# Patient Record
Sex: MALE | Race: BLACK OR AFRICAN AMERICAN | Employment: FULL TIME | URBAN - METROPOLITAN AREA
[De-identification: names, ages, dates, MRNs, and addresses within clinical notes are randomized per-mention and may not be internally consistent; named-entity substitution may affect disease eponyms.]

---

## 2023-09-24 ENCOUNTER — HOSPITAL ENCOUNTER (EMERGENCY)
Age: 68
Discharge: HOME | End: 2023-09-24
Attending: EMERGENCY MEDICINE
Payer: MEDICARE

## 2023-09-24 ENCOUNTER — APPOINTMENT (OUTPATIENT)
Dept: CT IMAGING | Age: 68
End: 2023-09-24
Payer: MEDICARE

## 2023-09-24 VITALS
WEIGHT: 210 LBS | HEIGHT: 74 IN | TEMPERATURE: 99 F | RESPIRATION RATE: 18 BRPM | BODY MASS INDEX: 26.95 KG/M2 | SYSTOLIC BLOOD PRESSURE: 143 MMHG | DIASTOLIC BLOOD PRESSURE: 86 MMHG | HEART RATE: 100 BPM | OXYGEN SATURATION: 100 %

## 2023-09-24 DIAGNOSIS — N32.0 BLADDER OUTLET OBSTRUCTION: Primary | ICD-10-CM

## 2023-09-24 DIAGNOSIS — R33.9 ACUTE ON CHRONIC URINARY RETENTION: ICD-10-CM

## 2023-09-24 LAB
ALBUMIN SERPL-MCNC: 4.2 G/DL (ref 3.4–5)
ALBUMIN/GLOB SERPL: 1.3 {RATIO} (ref 1.1–2.2)
ALP SERPL-CCNC: 82 U/L (ref 40–129)
ALT SERPL-CCNC: 20 U/L (ref 10–40)
ANION GAP SERPL CALCULATED.3IONS-SCNC: 12 MMOL/L (ref 3–16)
AST SERPL-CCNC: 24 U/L (ref 15–37)
BASOPHILS # BLD: 0.1 K/UL (ref 0–0.2)
BASOPHILS NFR BLD: 1.4 %
BILIRUB SERPL-MCNC: 0.4 MG/DL (ref 0–1)
BILIRUB UR QL STRIP.AUTO: NEGATIVE
BUN SERPL-MCNC: 13 MG/DL (ref 7–20)
CALCIUM SERPL-MCNC: 9.4 MG/DL (ref 8.3–10.6)
CHLORIDE SERPL-SCNC: 103 MMOL/L (ref 99–110)
CLARITY UR: CLEAR
CO2 SERPL-SCNC: 25 MMOL/L (ref 21–32)
COLOR UR: YELLOW
CREAT SERPL-MCNC: 0.9 MG/DL (ref 0.8–1.3)
DEPRECATED RDW RBC AUTO: 14.7 % (ref 12.4–15.4)
EOSINOPHIL # BLD: 0.3 K/UL (ref 0–0.6)
EOSINOPHIL NFR BLD: 3.6 %
GFR SERPLBLD CREATININE-BSD FMLA CKD-EPI: >60 ML/MIN/{1.73_M2}
GLUCOSE SERPL-MCNC: 119 MG/DL (ref 70–99)
GLUCOSE UR STRIP.AUTO-MCNC: NEGATIVE MG/DL
HCT VFR BLD AUTO: 42.7 % (ref 40.5–52.5)
HGB BLD-MCNC: 14.7 G/DL (ref 13.5–17.5)
HGB UR QL STRIP.AUTO: NEGATIVE
KETONES UR STRIP.AUTO-MCNC: NEGATIVE MG/DL
LEUKOCYTE ESTERASE UR QL STRIP.AUTO: NEGATIVE
LYMPHOCYTES # BLD: 3 K/UL (ref 1–5.1)
LYMPHOCYTES NFR BLD: 32.7 %
MCH RBC QN AUTO: 32.5 PG (ref 26–34)
MCHC RBC AUTO-ENTMCNC: 34.4 G/DL (ref 31–36)
MCV RBC AUTO: 94.4 FL (ref 80–100)
MONOCYTES # BLD: 1.1 K/UL (ref 0–1.3)
MONOCYTES NFR BLD: 11.3 %
NEUTROPHILS # BLD: 4.7 K/UL (ref 1.7–7.7)
NEUTROPHILS NFR BLD: 51 %
NITRITE UR QL STRIP.AUTO: NEGATIVE
PH UR STRIP.AUTO: 5.5 [PH] (ref 5–8)
PLATELET # BLD AUTO: 298 K/UL (ref 135–450)
PMV BLD AUTO: 8 FL (ref 5–10.5)
POTASSIUM SERPL-SCNC: 4.1 MMOL/L (ref 3.5–5.1)
PROT SERPL-MCNC: 7.5 G/DL (ref 6.4–8.2)
PROT UR STRIP.AUTO-MCNC: NEGATIVE MG/DL
RBC # BLD AUTO: 4.53 M/UL (ref 4.2–5.9)
SODIUM SERPL-SCNC: 140 MMOL/L (ref 136–145)
SP GR UR STRIP.AUTO: 1.02 (ref 1–1.03)
UA COMPLETE W REFLEX CULTURE PNL UR: NORMAL
UA DIPSTICK W REFLEX MICRO PNL UR: NORMAL
URN SPEC COLLECT METH UR: NORMAL
UROBILINOGEN UR STRIP-ACNC: 0.2 E.U./DL
WBC # BLD AUTO: 9.3 K/UL (ref 4–11)

## 2023-09-24 PROCEDURE — 99284 EMERGENCY DEPT VISIT MOD MDM: CPT

## 2023-09-24 PROCEDURE — 85025 COMPLETE CBC W/AUTO DIFF WBC: CPT

## 2023-09-24 PROCEDURE — 51798 US URINE CAPACITY MEASURE: CPT

## 2023-09-24 PROCEDURE — 51702 INSERT TEMP BLADDER CATH: CPT

## 2023-09-24 PROCEDURE — 74176 CT ABD & PELVIS W/O CONTRAST: CPT

## 2023-09-24 PROCEDURE — 80053 COMPREHEN METABOLIC PANEL: CPT

## 2023-09-24 PROCEDURE — 81003 URINALYSIS AUTO W/O SCOPE: CPT

## 2023-09-24 ASSESSMENT — PAIN SCALES - GENERAL: PAINLEVEL_OUTOF10: 10

## 2023-09-24 ASSESSMENT — PAIN - FUNCTIONAL ASSESSMENT: PAIN_FUNCTIONAL_ASSESSMENT: 0-10

## 2023-09-24 NOTE — ED NOTES
This RN to bedside to bladder scan patient. Patient standing in room with urinal and states \"I need to go to the bathroom to wipe myself. I pushed too hard and pooped myself\". Patient independently ambulatory to bathroom with steady gait at this time.       Wendy Jaramillo RN  09/24/23 5674

## 2023-09-24 NOTE — ED NOTES
Patient bladder scanned, 610 ml of urine showing in bladder. Dr. Sb Hastings made aware.       Jailene Warren, RN  09/24/23 0170

## 2023-09-24 NOTE — ED NOTES
Leg bag in place  Discharge instructions and follow up care reviewed  PIV removed without difficulty  Pt ambulatory to exit     [facility], RN  09/24/23 6301

## 2023-09-24 NOTE — ED PROVIDER NOTES
[facility] ([facility]) Emergency [address]    Alejandra Dance, MD, am the primary clinician of record. CHIEF COMPLAINT  Chief Complaint   Patient presents with    Urinary Retention     Patient states trouble urinating that started this am. States has had this problem before, hx of enlarged prostate. HISTORY OF PRESENT ILLNESS  Braxton Kinsey is a 79 y.o. male  who presents to the ED complaining of difficulty with urination starting this morning around 6 in the morning approximately. He has had lower abdominal distention and discomfort since then most of the midline. He has a history of enlarged prostate and is on Flomax already and has a history of this happening to him roughly 6 months ago according to the patient where he required a catheter. He denies any nausea vomiting diarrhea or constipation. No back pain or other abdominal pain. No testicular pain or scrotal edema. No fevers or chills. No other complaints, modifying factors or associated symptoms. I have reviewed the following from the nursing documentation. Past Medical History:   Diagnosis Date    Hypertension      History reviewed. No pertinent surgical history. History reviewed. No pertinent family history.   Social History     Socioeconomic History    Marital status:      Spouse name: Not on file    Number of children: Not on file    Years of education: Not on file    Highest education level: Not on file   Occupational History    Not on file   Tobacco Use    Smoking status: Every Day     Types: Cigarettes    Smokeless tobacco: Not on file   Substance and Sexual Activity    Alcohol use: Yes    Drug use: No    Sexual activity: Not on file   Other Topics Concern    Not on file   Social History Narrative    Not on file     Social Determinants of Health     Financial Resource Strain: Not on file   Food Insecurity: Not on file   Transportation Needs: Not on file   Physical Activity: Not on file   Stress: Not on file

## 2023-11-07 ENCOUNTER — HOSPITAL ENCOUNTER (EMERGENCY)
Age: 68
Discharge: HOME | End: 2023-11-07
Payer: MEDICARE

## 2023-11-07 VITALS
DIASTOLIC BLOOD PRESSURE: 75 MMHG | SYSTOLIC BLOOD PRESSURE: 136 MMHG | WEIGHT: 197 LBS | HEART RATE: 92 BPM | TEMPERATURE: 98.5 F | OXYGEN SATURATION: 95 % | BODY MASS INDEX: 26.68 KG/M2 | RESPIRATION RATE: 16 BRPM | HEIGHT: 72 IN

## 2023-11-07 DIAGNOSIS — N30.00 ACUTE CYSTITIS WITHOUT HEMATURIA: Primary | ICD-10-CM

## 2023-11-07 DIAGNOSIS — N40.1 BENIGN PROSTATIC HYPERPLASIA WITH URINARY FREQUENCY: ICD-10-CM

## 2023-11-07 DIAGNOSIS — R35.0 BENIGN PROSTATIC HYPERPLASIA WITH URINARY FREQUENCY: ICD-10-CM

## 2023-11-07 LAB
ALBUMIN SERPL-MCNC: 3.9 G/DL (ref 3.4–5)
ALBUMIN/GLOB SERPL: 1.1 {RATIO} (ref 1.1–2.2)
ALP SERPL-CCNC: 70 U/L (ref 40–129)
ALT SERPL-CCNC: 18 U/L (ref 10–40)
ANION GAP SERPL CALCULATED.3IONS-SCNC: 11 MMOL/L (ref 3–16)
AST SERPL-CCNC: 18 U/L (ref 15–37)
BACTERIA URNS QL MICRO: ABNORMAL /HPF
BASOPHILS # BLD: 0.2 K/UL (ref 0–0.2)
BASOPHILS NFR BLD: 1.4 %
BILIRUB SERPL-MCNC: 0.4 MG/DL (ref 0–1)
BILIRUB UR QL STRIP.AUTO: NEGATIVE
BUN SERPL-MCNC: 15 MG/DL (ref 7–20)
CALCIUM SERPL-MCNC: 9.3 MG/DL (ref 8.3–10.6)
CHLORIDE SERPL-SCNC: 99 MMOL/L (ref 99–110)
CLARITY UR: CLEAR
CO2 SERPL-SCNC: 25 MMOL/L (ref 21–32)
COLOR UR: YELLOW
CREAT SERPL-MCNC: 1 MG/DL (ref 0.8–1.3)
DEPRECATED RDW RBC AUTO: 14.2 % (ref 12.4–15.4)
EOSINOPHIL # BLD: 0.3 K/UL (ref 0–0.6)
EOSINOPHIL NFR BLD: 2.5 %
EPI CELLS #/AREA URNS AUTO: 1 /HPF (ref 0–5)
GFR SERPLBLD CREATININE-BSD FMLA CKD-EPI: >60 ML/MIN/{1.73_M2}
GLUCOSE SERPL-MCNC: 102 MG/DL (ref 70–99)
GLUCOSE UR STRIP.AUTO-MCNC: NEGATIVE MG/DL
HCT VFR BLD AUTO: 36.8 % (ref 40.5–52.5)
HGB BLD-MCNC: 12.5 G/DL (ref 13.5–17.5)
HGB UR QL STRIP.AUTO: NEGATIVE
HYALINE CASTS #/AREA URNS AUTO: 0 /LPF (ref 0–8)
KETONES UR STRIP.AUTO-MCNC: NEGATIVE MG/DL
LEUKOCYTE ESTERASE UR QL STRIP.AUTO: ABNORMAL
LYMPHOCYTES # BLD: 3 K/UL (ref 1–5.1)
LYMPHOCYTES NFR BLD: 25.9 %
MCH RBC QN AUTO: 31.5 PG (ref 26–34)
MCHC RBC AUTO-ENTMCNC: 34 G/DL (ref 31–36)
MCV RBC AUTO: 92.7 FL (ref 80–100)
MONOCYTES # BLD: 1.1 K/UL (ref 0–1.3)
MONOCYTES NFR BLD: 9.8 %
NEUTROPHILS # BLD: 7 K/UL (ref 1.7–7.7)
NEUTROPHILS NFR BLD: 60.4 %
NITRITE UR QL STRIP.AUTO: NEGATIVE
PH UR STRIP.AUTO: 6 [PH] (ref 5–8)
PLATELET # BLD AUTO: 382 K/UL (ref 135–450)
PMV BLD AUTO: 7.2 FL (ref 5–10.5)
POTASSIUM SERPL-SCNC: 3.6 MMOL/L (ref 3.5–5.1)
PROT SERPL-MCNC: 7.5 G/DL (ref 6.4–8.2)
PROT UR STRIP.AUTO-MCNC: ABNORMAL MG/DL
RBC # BLD AUTO: 3.97 M/UL (ref 4.2–5.9)
RBC CLUMPS #/AREA URNS AUTO: 1 /HPF (ref 0–4)
SODIUM SERPL-SCNC: 135 MMOL/L (ref 136–145)
SP GR UR STRIP.AUTO: 1.02 (ref 1–1.03)
UA COMPLETE W REFLEX CULTURE PNL UR: YES
UA DIPSTICK W REFLEX MICRO PNL UR: YES
URN SPEC COLLECT METH UR: ABNORMAL
UROBILINOGEN UR STRIP-ACNC: 0.2 E.U./DL
WBC # BLD AUTO: 11.7 K/UL (ref 4–11)
WBC #/AREA URNS AUTO: 32 /HPF (ref 0–5)

## 2023-11-07 PROCEDURE — 81001 URINALYSIS AUTO W/SCOPE: CPT

## 2023-11-07 PROCEDURE — 87086 URINE CULTURE/COLONY COUNT: CPT

## 2023-11-07 PROCEDURE — 99283 EMERGENCY DEPT VISIT LOW MDM: CPT

## 2023-11-07 PROCEDURE — 85025 COMPLETE CBC W/AUTO DIFF WBC: CPT

## 2023-11-07 PROCEDURE — 80053 COMPREHEN METABOLIC PANEL: CPT

## 2023-11-07 RX ORDER — PHENAZOPYRIDINE HYDROCHLORIDE 100 MG/1
100 TABLET, FILM COATED ORAL 3 TIMES DAILY PRN
Qty: 6 TABLET | Refills: 0 | Status: SHIPPED | OUTPATIENT
Start: 2023-11-07 | End: 2023-11-09

## 2023-11-07 RX ORDER — CEFUROXIME AXETIL 500 MG/1
500 TABLET ORAL 2 TIMES DAILY
Qty: 20 TABLET | Refills: 0 | Status: SHIPPED | OUTPATIENT
Start: 2023-11-07 | End: 2023-11-17

## 2023-11-07 ASSESSMENT — PAIN SCALES - GENERAL: PAINLEVEL_OUTOF10: 2

## 2023-11-07 NOTE — ED PROVIDER NOTES
Unable to reach patient.  Result letter sent to patient's home address.   [address]    Schedule an appointment as soon as possible for a visit in 1 week  For re-check    [facility] Emergency Department  [place]  [phone]    As needed      DISCHARGE MEDICATIONS:  Discharge Medication List as of 11/7/2023 10:42 AM        START taking these medications    Details   cefUROXime (CEFTIN) 500 MG tablet Take 1 tablet by mouth 2 times daily for 10 days, Disp-20 tablet, R-0Normal      phenazopyridine (PYRIDIUM) 100 MG tablet Take 1 tablet by mouth 3 times daily as needed for Pain, Disp-6 tablet, R-0Normal             DISCONTINUED MEDICATIONS:  Discharge Medication List as of 11/7/2023 10:42 AM                 (Please note that portions of this note were completed with a voice recognition program.  Efforts were made to edit the dictations but occasionally words are mis-transcribed.)    Jillian Mcgee PA-C (electronically signed)        Jillian Mcgee PA-C  11/07/23 6096

## 2023-11-08 LAB — BACTERIA UR CULT: NORMAL

## 2025-02-28 ENCOUNTER — APPOINTMENT (OUTPATIENT)
Age: 70
End: 2025-02-28
Attending: HOSPITALIST
Payer: MEDICARE

## 2025-02-28 ENCOUNTER — APPOINTMENT (OUTPATIENT)
Dept: GENERAL RADIOLOGY | Age: 70
End: 2025-02-28
Payer: MEDICARE

## 2025-02-28 ENCOUNTER — APPOINTMENT (OUTPATIENT)
Dept: MRI IMAGING | Age: 70
End: 2025-02-28
Payer: MEDICARE

## 2025-02-28 ENCOUNTER — HOSPITAL ENCOUNTER (OUTPATIENT)
Age: 70
Setting detail: OBSERVATION
Discharge: HOME | End: 2025-03-01
Attending: EMERGENCY MEDICINE | Admitting: HOSPITALIST
Payer: MEDICARE

## 2025-02-28 ENCOUNTER — APPOINTMENT (OUTPATIENT)
Dept: CT IMAGING | Age: 70
End: 2025-02-28
Payer: MEDICARE

## 2025-02-28 DIAGNOSIS — R93.89 ABNORMAL COMPUTED TOMOGRAPHY ANGIOGRAPHY (CTA) OF NECK: ICD-10-CM

## 2025-02-28 DIAGNOSIS — R06.02 SHORTNESS OF BREATH: ICD-10-CM

## 2025-02-28 DIAGNOSIS — R42 DIZZINESS: ICD-10-CM

## 2025-02-28 DIAGNOSIS — R29.90 STROKE-LIKE SYMPTOMS: Primary | ICD-10-CM

## 2025-02-28 PROBLEM — G45.9 TIA (TRANSIENT ISCHEMIC ATTACK): Status: ACTIVE | Noted: 2025-02-28

## 2025-02-28 LAB
ALBUMIN SERPL-MCNC: 4.1 G/DL (ref 3.4–5)
ALBUMIN/GLOB SERPL: 1.4 {RATIO} (ref 1.1–2.2)
ALP SERPL-CCNC: 78 U/L (ref 40–129)
ALT SERPL-CCNC: 39 U/L (ref 10–40)
ANION GAP SERPL CALCULATED.3IONS-SCNC: 10 MMOL/L (ref 3–16)
APTT BLD: 29.9 SEC (ref 22.1–36.4)
AST SERPL-CCNC: 42 U/L (ref 15–37)
BASOPHILS # BLD: 0 K/UL (ref 0–0.2)
BASOPHILS NFR BLD: 0.4 %
BILIRUB SERPL-MCNC: 0.3 MG/DL (ref 0–1)
BILIRUB UR QL STRIP.AUTO: NEGATIVE
BUN SERPL-MCNC: 15 MG/DL (ref 7–20)
CALCIUM SERPL-MCNC: 9.5 MG/DL (ref 8.3–10.6)
CHLORIDE SERPL-SCNC: 102 MMOL/L (ref 99–110)
CLARITY UR: CLEAR
CO2 SERPL-SCNC: 26 MMOL/L (ref 21–32)
COLOR UR: YELLOW
CREAT SERPL-MCNC: 1 MG/DL (ref 0.8–1.3)
DEPRECATED RDW RBC AUTO: 15.2 % (ref 12.4–15.4)
ECHO AO ASC DIAM: 3.8 CM
ECHO AO ASCENDING AORTA INDEX: 1.87 CM/M2
ECHO AO ROOT DIAM: 3.9 CM
ECHO AO ROOT INDEX: 1.92 CM/M2
ECHO AV AREA PEAK VELOCITY: 3.1 CM2
ECHO AV AREA VTI: 2.9 CM2
ECHO AV AREA/BSA PEAK VELOCITY: 1.5 CM2/M2
ECHO AV AREA/BSA VTI: 1.4 CM2/M2
ECHO AV MEAN GRADIENT: 2 MMHG
ECHO AV MEAN VELOCITY: 0.6 M/S
ECHO AV PEAK GRADIENT: 3 MMHG
ECHO AV PEAK VELOCITY: 0.9 M/S
ECHO AV VELOCITY RATIO: 1
ECHO AV VTI: 21.2 CM
ECHO BSA: 2.1 M2
ECHO LA AREA 2C: 18.8 CM2
ECHO LA AREA 4C: 13.6 CM2
ECHO LA DIAMETER INDEX: 1.63 CM/M2
ECHO LA DIAMETER: 3.3 CM
ECHO LA MAJOR AXIS: 4.5 CM
ECHO LA MINOR AXIS: 5.3 CM
ECHO LA TO AORTIC ROOT RATIO: 0.85
ECHO LA VOL BP: 47 ML (ref 18–58)
ECHO LA VOL MOD A2C: 56 ML (ref 18–58)
ECHO LA VOL MOD A4C: 33 ML (ref 18–58)
ECHO LA VOL/BSA BIPLANE: 23 ML/M2 (ref 16–34)
ECHO LA VOLUME INDEX MOD A2C: 28 ML/M2 (ref 16–34)
ECHO LA VOLUME INDEX MOD A4C: 16 ML/M2 (ref 16–34)
ECHO LV E' LATERAL VELOCITY: 7.51 CM/S
ECHO LV E' SEPTAL VELOCITY: 4.68 CM/S
ECHO LV EDV A2C: 121 ML
ECHO LV EDV A4C: 119 ML
ECHO LV EDV INDEX A4C: 59 ML/M2
ECHO LV EDV NDEX A2C: 60 ML/M2
ECHO LV EF PHYSICIAN: 43 %
ECHO LV EJECTION FRACTION A2C: 43 %
ECHO LV EJECTION FRACTION A4C: 41 %
ECHO LV EJECTION FRACTION BIPLANE: 43 % (ref 55–100)
ECHO LV ESV A2C: 70 ML
ECHO LV ESV A4C: 70 ML
ECHO LV ESV INDEX A2C: 34 ML/M2
ECHO LV ESV INDEX A4C: 34 ML/M2
ECHO LV FRACTIONAL SHORTENING: 37 % (ref 28–44)
ECHO LV INTERNAL DIMENSION DIASTOLE INDEX: 2.02 CM/M2
ECHO LV INTERNAL DIMENSION DIASTOLIC: 4.1 CM (ref 4.2–5.9)
ECHO LV INTERNAL DIMENSION SYSTOLIC INDEX: 1.28 CM/M2
ECHO LV INTERNAL DIMENSION SYSTOLIC: 2.6 CM
ECHO LV IVSD: 1.3 CM (ref 0.6–1)
ECHO LV MASS 2D: 193.5 G (ref 88–224)
ECHO LV MASS INDEX 2D: 95.3 G/M2 (ref 49–115)
ECHO LV POSTERIOR WALL DIASTOLIC: 1.3 CM (ref 0.6–1)
ECHO LV RELATIVE WALL THICKNESS RATIO: 0.63
ECHO LVOT AREA: 3.1 CM2
ECHO LVOT AV VTI INDEX: 0.92
ECHO LVOT DIAM: 2 CM
ECHO LVOT MEAN GRADIENT: 1 MMHG
ECHO LVOT PEAK GRADIENT: 3 MMHG
ECHO LVOT PEAK VELOCITY: 0.9 M/S
ECHO LVOT STROKE VOLUME INDEX: 30.2 ML/M2
ECHO LVOT SV: 61.2 ML
ECHO LVOT VTI: 19.5 CM
ECHO MV A VELOCITY: 1.11 M/S
ECHO MV E VELOCITY: 0.7 M/S
ECHO MV E/A RATIO: 0.63
ECHO MV E/E' LATERAL: 9.32
ECHO MV E/E' RATIO (AVERAGED): 12.14
ECHO MV E/E' SEPTAL: 14.96
ECHO PV MAX VELOCITY: 0.8 M/S
ECHO PV PEAK GRADIENT: 2 MMHG
ECHO RA AREA 4C: 15.1 CM2
ECHO RA END SYSTOLIC VOLUME APICAL 4 CHAMBER INDEX BSA: 17 ML/M2
ECHO RA VOLUME: 35 ML
ECHO RV BASAL DIMENSION: 3.5 CM
ECHO RV FREE WALL PEAK S': 9.4 CM/S
ECHO RV LONGITUDINAL DIMENSION: 7.4 CM
ECHO RV MID DIMENSION: 2.2 CM
ECHO RV TAPSE: 2.3 CM (ref 1.7–?)
EKG ATRIAL RATE: 72 BPM
EKG DIAGNOSIS: NORMAL
EKG P AXIS: 26 DEGREES
EKG P-R INTERVAL: 162 MS
EKG Q-T INTERVAL: 412 MS
EKG QRS DURATION: 82 MS
EKG QTC CALCULATION (BAZETT): 451 MS
EKG R AXIS: 12 DEGREES
EKG T AXIS: 20 DEGREES
EKG VENTRICULAR RATE: 72 BPM
EOSINOPHIL # BLD: 0.3 K/UL (ref 0–0.6)
EOSINOPHIL NFR BLD: 5.2 %
GFR SERPLBLD CREATININE-BSD FMLA CKD-EPI: 81 ML/MIN/{1.73_M2}
GLUCOSE BLD-MCNC: 98 MG/DL (ref 70–99)
GLUCOSE SERPL-MCNC: 95 MG/DL (ref 70–99)
GLUCOSE UR STRIP.AUTO-MCNC: NEGATIVE MG/DL
HCT VFR BLD AUTO: 40.7 % (ref 40.5–52.5)
HGB BLD-MCNC: 14 G/DL (ref 13.5–17.5)
HGB UR QL STRIP.AUTO: NEGATIVE
INR PPP: 0.93 (ref 0.85–1.15)
KETONES UR STRIP.AUTO-MCNC: NEGATIVE MG/DL
LEUKOCYTE ESTERASE UR QL STRIP.AUTO: NEGATIVE
LYMPHOCYTES # BLD: 2.9 K/UL (ref 1–5.1)
LYMPHOCYTES NFR BLD: 48.7 %
MCH RBC QN AUTO: 31.4 PG (ref 26–34)
MCHC RBC AUTO-ENTMCNC: 34.4 G/DL (ref 31–36)
MCV RBC AUTO: 91.4 FL (ref 80–100)
MONOCYTES # BLD: 0.8 K/UL (ref 0–1.3)
MONOCYTES NFR BLD: 14 %
NEUTROPHILS # BLD: 1.9 K/UL (ref 1.7–7.7)
NEUTROPHILS NFR BLD: 31.7 %
NITRITE UR QL STRIP.AUTO: NEGATIVE
NT-PROBNP SERPL-MCNC: 51 PG/ML (ref 0–124)
PERFORMED ON: NORMAL
PH UR STRIP.AUTO: 5.5 [PH] (ref 5–8)
PLATELET # BLD AUTO: 271 K/UL (ref 135–450)
PMV BLD AUTO: 8.1 FL (ref 5–10.5)
POTASSIUM SERPL-SCNC: 4.2 MMOL/L (ref 3.5–5.1)
PROT SERPL-MCNC: 7.1 G/DL (ref 6.4–8.2)
PROT UR STRIP.AUTO-MCNC: NEGATIVE MG/DL
PROTHROMBIN TIME: 12.7 SEC (ref 11.9–14.9)
RBC # BLD AUTO: 4.45 M/UL (ref 4.2–5.9)
SODIUM SERPL-SCNC: 138 MMOL/L (ref 136–145)
SP GR UR STRIP.AUTO: >=1.03 (ref 1–1.03)
TROPONIN, HIGH SENSITIVITY: 11 NG/L (ref 0–22)
TROPONIN, HIGH SENSITIVITY: 12 NG/L (ref 0–22)
UA COMPLETE W REFLEX CULTURE PNL UR: NORMAL
UA DIPSTICK W REFLEX MICRO PNL UR: NORMAL
URN SPEC COLLECT METH UR: NORMAL
UROBILINOGEN UR STRIP-ACNC: 0.2 E.U./DL
WBC # BLD AUTO: 6 K/UL (ref 4–11)

## 2025-02-28 PROCEDURE — 70551 MRI BRAIN STEM W/O DYE: CPT

## 2025-02-28 PROCEDURE — 92610 EVALUATE SWALLOWING FUNCTION: CPT

## 2025-02-28 PROCEDURE — 70450 CT HEAD/BRAIN W/O DYE: CPT

## 2025-02-28 PROCEDURE — 93005 ELECTROCARDIOGRAM TRACING: CPT | Performed by: EMERGENCY MEDICINE

## 2025-02-28 PROCEDURE — 71045 X-RAY EXAM CHEST 1 VIEW: CPT

## 2025-02-28 PROCEDURE — 6370000000 HC RX 637 (ALT 250 FOR IP): Performed by: HOSPITALIST

## 2025-02-28 PROCEDURE — 96372 THER/PROPH/DIAG INJ SC/IM: CPT

## 2025-02-28 PROCEDURE — 6360000002 HC RX W HCPCS: Performed by: HOSPITALIST

## 2025-02-28 PROCEDURE — 93010 ELECTROCARDIOGRAM REPORT: CPT | Performed by: INTERNAL MEDICINE

## 2025-02-28 PROCEDURE — 83880 ASSAY OF NATRIURETIC PEPTIDE: CPT

## 2025-02-28 PROCEDURE — APPNB30 APP NON BILLABLE TIME 0-30 MINS

## 2025-02-28 PROCEDURE — 84484 ASSAY OF TROPONIN QUANT: CPT

## 2025-02-28 PROCEDURE — 80053 COMPREHEN METABOLIC PANEL: CPT

## 2025-02-28 PROCEDURE — 6370000000 HC RX 637 (ALT 250 FOR IP): Performed by: NURSE PRACTITIONER

## 2025-02-28 PROCEDURE — G0378 HOSPITAL OBSERVATION PER HR: HCPCS

## 2025-02-28 PROCEDURE — 2500000003 HC RX 250 WO HCPCS: Performed by: HOSPITALIST

## 2025-02-28 PROCEDURE — 93306 TTE W/DOPPLER COMPLETE: CPT | Performed by: INTERNAL MEDICINE

## 2025-02-28 PROCEDURE — 85610 PROTHROMBIN TIME: CPT

## 2025-02-28 PROCEDURE — 6360000004 HC RX CONTRAST MEDICATION: Performed by: PHYSICIAN ASSISTANT

## 2025-02-28 PROCEDURE — 70496 CT ANGIOGRAPHY HEAD: CPT

## 2025-02-28 PROCEDURE — 99285 EMERGENCY DEPT VISIT HI MDM: CPT

## 2025-02-28 PROCEDURE — 93306 TTE W/DOPPLER COMPLETE: CPT

## 2025-02-28 PROCEDURE — 85025 COMPLETE CBC W/AUTO DIFF WBC: CPT

## 2025-02-28 PROCEDURE — 6370000000 HC RX 637 (ALT 250 FOR IP): Performed by: PHYSICIAN ASSISTANT

## 2025-02-28 PROCEDURE — 81003 URINALYSIS AUTO W/O SCOPE: CPT

## 2025-02-28 PROCEDURE — 85730 THROMBOPLASTIN TIME PARTIAL: CPT

## 2025-02-28 RX ORDER — ATORVASTATIN CALCIUM 20 MG/1
TABLET, FILM COATED ORAL
COMMUNITY
Start: 2024-11-27

## 2025-02-28 RX ORDER — ENOXAPARIN SODIUM 100 MG/ML
40 INJECTION SUBCUTANEOUS DAILY
Status: DISCONTINUED | OUTPATIENT
Start: 2025-02-28 | End: 2025-03-01 | Stop reason: HOSPADM

## 2025-02-28 RX ORDER — POLYETHYLENE GLYCOL 3350 17 G/17G
17 POWDER, FOR SOLUTION ORAL DAILY PRN
Status: DISCONTINUED | OUTPATIENT
Start: 2025-02-28 | End: 2025-03-01 | Stop reason: HOSPADM

## 2025-02-28 RX ORDER — LISINOPRIL 20 MG/1
20 TABLET ORAL DAILY
COMMUNITY

## 2025-02-28 RX ORDER — LISINOPRIL AND HYDROCHLOROTHIAZIDE 12.5; 2 MG/1; MG/1
1 TABLET ORAL DAILY
Status: ON HOLD | COMMUNITY
Start: 2024-12-26 | End: 2025-03-01 | Stop reason: HOSPADM

## 2025-02-28 RX ORDER — SODIUM CHLORIDE 0.9 % (FLUSH) 0.9 %
5-40 SYRINGE (ML) INJECTION PRN
Status: DISCONTINUED | OUTPATIENT
Start: 2025-02-28 | End: 2025-03-01 | Stop reason: HOSPADM

## 2025-02-28 RX ORDER — ASPIRIN 81 MG/1
81 TABLET, CHEWABLE ORAL DAILY
COMMUNITY

## 2025-02-28 RX ORDER — ASPIRIN 81 MG/1
81 TABLET, CHEWABLE ORAL DAILY
Status: DISCONTINUED | OUTPATIENT
Start: 2025-03-01 | End: 2025-03-01 | Stop reason: HOSPADM

## 2025-02-28 RX ORDER — ONDANSETRON 2 MG/ML
4 INJECTION INTRAMUSCULAR; INTRAVENOUS EVERY 6 HOURS PRN
Status: DISCONTINUED | OUTPATIENT
Start: 2025-02-28 | End: 2025-03-01 | Stop reason: HOSPADM

## 2025-02-28 RX ORDER — IOPAMIDOL 755 MG/ML
75 INJECTION, SOLUTION INTRAVASCULAR
Status: COMPLETED | OUTPATIENT
Start: 2025-02-28 | End: 2025-02-28

## 2025-02-28 RX ORDER — SODIUM CHLORIDE 9 MG/ML
INJECTION, SOLUTION INTRAVENOUS PRN
Status: DISCONTINUED | OUTPATIENT
Start: 2025-02-28 | End: 2025-03-01 | Stop reason: HOSPADM

## 2025-02-28 RX ORDER — ATORVASTATIN CALCIUM 80 MG/1
80 TABLET, FILM COATED ORAL NIGHTLY
Status: DISCONTINUED | OUTPATIENT
Start: 2025-02-28 | End: 2025-03-01 | Stop reason: HOSPADM

## 2025-02-28 RX ORDER — LISINOPRIL AND HYDROCHLOROTHIAZIDE 12.5; 2 MG/1; MG/1
1 TABLET ORAL DAILY
Status: DISCONTINUED | OUTPATIENT
Start: 2025-02-28 | End: 2025-02-28

## 2025-02-28 RX ORDER — ONDANSETRON 4 MG/1
4 TABLET, ORALLY DISINTEGRATING ORAL EVERY 8 HOURS PRN
Status: DISCONTINUED | OUTPATIENT
Start: 2025-02-28 | End: 2025-03-01 | Stop reason: HOSPADM

## 2025-02-28 RX ORDER — ASPIRIN 81 MG/1
324 TABLET, CHEWABLE ORAL ONCE
Status: COMPLETED | OUTPATIENT
Start: 2025-02-28 | End: 2025-02-28

## 2025-02-28 RX ORDER — SODIUM CHLORIDE 0.9 % (FLUSH) 0.9 %
5-40 SYRINGE (ML) INJECTION EVERY 12 HOURS SCHEDULED
Status: DISCONTINUED | OUTPATIENT
Start: 2025-02-28 | End: 2025-03-01 | Stop reason: HOSPADM

## 2025-02-28 RX ORDER — LISINOPRIL 20 MG/1
20 TABLET ORAL DAILY
Status: DISCONTINUED | OUTPATIENT
Start: 2025-03-01 | End: 2025-03-01 | Stop reason: HOSPADM

## 2025-02-28 RX ORDER — ASPIRIN 300 MG/1
300 SUPPOSITORY RECTAL DAILY
Status: DISCONTINUED | OUTPATIENT
Start: 2025-03-01 | End: 2025-03-01 | Stop reason: HOSPADM

## 2025-02-28 RX ADMIN — IOPAMIDOL 75 ML: 755 INJECTION, SOLUTION INTRAVENOUS at 11:16

## 2025-02-28 RX ADMIN — MELATONIN TAB 3 MG 6 MG: 3 TAB at 23:52

## 2025-02-28 RX ADMIN — ATORVASTATIN CALCIUM 80 MG: 80 TABLET, FILM COATED ORAL at 21:50

## 2025-02-28 RX ADMIN — ENOXAPARIN SODIUM 40 MG: 100 INJECTION SUBCUTANEOUS at 17:36

## 2025-02-28 RX ADMIN — ASPIRIN 324 MG: 81 TABLET, CHEWABLE ORAL at 12:16

## 2025-02-28 RX ADMIN — Medication 10 ML: at 21:50

## 2025-02-28 ASSESSMENT — ENCOUNTER SYMPTOMS
COLOR CHANGE: 0
DIARRHEA: 0
CONSTIPATION: 0
SHORTNESS OF BREATH: 0
BACK PAIN: 0
EYE ITCHING: 0
PHOTOPHOBIA: 0
EYE REDNESS: 0
COUGH: 0
EYE DISCHARGE: 0
CHEST TIGHTNESS: 0
RESPIRATORY NEGATIVE: 1
EYE PAIN: 0
NAUSEA: 0
VOMITING: 0
ABDOMINAL DISTENTION: 0
ABDOMINAL PAIN: 0

## 2025-02-28 ASSESSMENT — LIFESTYLE VARIABLES
HOW MANY STANDARD DRINKS CONTAINING ALCOHOL DO YOU HAVE ON A TYPICAL DAY: PATIENT DOES NOT DRINK
HOW OFTEN DO YOU HAVE A DRINK CONTAINING ALCOHOL: NEVER

## 2025-02-28 ASSESSMENT — PAIN - FUNCTIONAL ASSESSMENT: PAIN_FUNCTIONAL_ASSESSMENT: NONE - DENIES PAIN

## 2025-02-28 NOTE — ED PROVIDER NOTES
[facility] EMERGENCY DEPARTMENT  EMERGENCY DEPARTMENT ENCOUNTER        Pt Name: Geovani Lagunas  MRN: [id number]  Birthdate 1955  Date of evaluation: 2/28/2025  Provider: TARYN Martinez  PCP: William Kruse MD  Note Started: 11:03 AM EST 2/28/25       I have seen and evaluated this patient with my supervising physician Bryant Juan MD.      CHIEF COMPLAINT       Chief Complaint   Patient presents with    Dizziness     Pt states that around 0830 after he was up for a bit he started having dizziness. Pt states that when he was standing he felt off balance.        HISTORY OF PRESENT ILLNESS: 1 or more Elements     History From: Patient  Limitations to history : None    Geovani Lagunas is a 69 y.o. male with past medical history of hypertension who presents ED with complaint of dizziness.  Patient states he woke up this morning felt fine.  Reports he was doing his normal routine this morning and around 830 he states he started to feel dizzy.  States he felt off balance when he was standing.  He states he looked in the mirror and felt like he had a droop to the right side of his face.  He states he felt like his vision was blurry as well.  He states symptoms lasted seconds to minutes and have since resolved.  He reports he still does not feel completely normal but states he feels much better.  He states he feels \"90 to 95% better\".  He has been able to ambulate without difficulty.  Denies headache.  Denies any facial droop at this time.  Denies visual changes or speech disturbance at this time.  Denies numbness or tingling.  Denies weakness to the arms or legs.  Denies chest pain, shortness of breath, abdominal pain, nausea/vomiting, urinary symptoms or changes in bowel movements.  Denies fever or chills.  Denies any rashes or lesions.  He is not on any blood thinners.  Denies history of CVA/TIA in the past    Nursing Notes were all reviewed and agreed with or any disagreements were  this time.  CT of the head without intracranial hemorrhage.  CTA with no LVO.  There is noncalcified plaque contributing to moderate narrowing to the left proximal subclavian artery and 25% stenosis of the left proximal cervical ICA.  Patient reports improved symptoms upon repeat evaluation here in the emergency room.  Has continued NIH of 0.  Believe would benefit from admission for further evaluation of potential TIA.  Case discussed with hospital service for admission.  Ordered aspirin here in the emergency department.       I am the Primary Clinician of Record.  FINAL IMPRESSION      1. Stroke-like symptoms    2. Dizziness    3. Abnormal computed tomography angiography (CTA) of neck          DISPOSITION/PLAN     DISPOSITION Decision To Admit 02/28/2025 11:59:05 AM   DISPOSITION CONDITION Stable           PATIENT REFERRED TO:  No follow-up provider specified.    DISCHARGE MEDICATIONS:  New Prescriptions    No medications on file       DISCONTINUED MEDICATIONS:  Discontinued Medications    No medications on file              (Please note that portions of this note were completed with a voice recognition program.  Efforts were made to edit the dictations but occasionally words are mis-transcribed.)    TARYN Martinez (electronically signed)       Vicente Simmons PA  02/28/25 9661

## 2025-02-28 NOTE — ED PROVIDER NOTES
I have personally performed a face to face diagnostic evaluation on this patient. I have fully participated in the care of this patient I personally saw the patient and performed a substantive portion of the visit including all aspects of the medical decision making.  I have reviewed and agree with all pertinent clinical information including history, physical exam, diagnostic tests, and the plan.      HPI: Geovani Lagunas presented with dyspnea and has since started around 830 this morning after he was awake.  Patient had some dizziness.  Felt unsteady right-sided facial droop and some vision changes.  Symptoms have 90% improved at this time.  History of hypertension history of hyperlipidemia.  Patient denies any previous history of stroke.  Not on any blood thinners.  No fevers or chills.  Currently symptoms are mostly improved he is able to walk under his own power.  Chief Complaint   Patient presents with    Dizziness     Pt states that around 0830 after he was up for a bit he started having dizziness. Pt states that when he was standing he felt off balance.       Review of Systems: See ED note  Vital Signs: BP (!) 162/74   Pulse 77   Temp 98.1 °F (36.7 °C) (Oral)   Resp 16   Ht 1.676 m (5' 6\")   Wt 94.8 kg (208 lb 14.4 oz)   SpO2 99%   BMI 33.72 kg/m²     Alert 69 y.o. male who does not appear toxic or acutely ill  HENT: Atraumatic, oral mucosa moist  Neck: Grossly normal ROM  Chest/Lungs: respiratory effort normal   Musculoskeletal: Grossly normal ROM  Skin: No palor or diaphoresis  Neuro: NIH 0 GCS 15 normal strength sensation upper and lower extremity normal finger-to-nose pupils equal and reactive normal extraocular motion    Medical Decision Making and Plan:  Pertinent Labs & Imaging studies reviewed. (See ED chart for details)  I agree with ED assessment and plan.  69-year-old male presents for dizziness, TIA.  Symptoms are almost completely resolved but still somewhat present at this time.   Patient is ambulatory without difficulty mildly hypertensive history of hyperlipidemia.  Afebrile not tachycardic saturating on room air CT head is unremarkable CTA shows carotid stenosis as well as subclavian stenosis.  Will give patient aspirin will plan on admission.  See ED note for further details.    I personally saw the patient and independently provided 0 minutes of nonconcurrent critical care out of the total shared critical care time provided    This includes multiple reevaluations, vital sign monitoring, pulse oximetry monitoring, telemetry monitoring, clinical response to the IV medications, reviewing the nursing notes, consultation time, dictation/documentation time, and interpretation of the labwork. (This time excludes time spent performing procedures).      EKG: All EKG's are interpreted by the Emergency Department Physician who either signs or Co-signs this chart in the absence of a cardiologist.    EKG Interpretation    Interpreted by emergency department physician    Rhythm: normal sinus   Rate: normal  Axis: normal  Ectopy: none  Conduction: normal  ST Segments: normal  T Waves: normal  Q Waves: none    Clinical Impression: Normal sinus rhythm, no significant T wave or ST changes.  Normal WI interval, normal QRS duration, normal QT QTC.  Normal axis.  No arrhythmia or signs of ischemia.  Otherwise normal EKG. Interpreted by myself.         Bryant Juan MD  02/28/25 9534

## 2025-02-28 NOTE — PROGRESS NOTES
Neurology consult noted.      Came to see the patient this afternoon but currently off the floor for testing.   Will see patient tomorrow

## 2025-02-28 NOTE — PROGRESS NOTES
Speech Language Pathology  [facility]  [phone]  SLP Clinical Swallow Evaluation       Patient: Geovani Lagunas   : 1955   MRN: [id number]      Evaluation Date: 2025      Admitting Dx: Shortness of breath [R06.02]  TIA (transient ischemic attack) [G45.9]  Dizziness [R42]  Stroke-like symptoms [R29.90]  Abnormal computed tomography angiography (CTA) of neck [R93.89]  Pain: Denies                                  Recommendations      Recommended Diet and Intervention 2025:  Diet Solids Recommendation:  Regular texture diet  Liquid Consistency Recommendation:  Thin liquids  Recommended form of Meds: Meds whole with water         Compensatory strategies: Upright as possible with all PO intake     Discharge Recommendations:  No further follow-up appears indicated at this time.     History/Course of Treatment     H&P:  Geovani Lagunas is a 69 y.o. male who presented with  SPresents to ER with complaints of dizziness.  Patient happily woke up this morning was feeling fine.  Went about his routine activity.  On 830 started having dizziness.  Fell off balance.  Specially when standing and walking in straight line.  Patient noted that his right side of the face was little drooping.  Having some blurry vision.  Lasted about seconds to a minute and then resolved.  Currently completely feeling better.  He said feels 95% better.  He has been ambulating without difficulty denies any headache currently no facial droop.  No chest pain no shortness of breath.  Stroke alert was called not a candidate for TNK due to improving symptoms.     Imaging:  Chest X-ray:   Mild prominence of the perihilar interstitial markings likely representing  central vascular congestion.  Bronchitis could appear similar.    Head CT:   1. No acute intracranial abnormality.        History/Prior Level of Function:   Living Status: Home  Prior Dysphagia History: none per chart   Prior Speech

## 2025-02-28 NOTE — H&P
HOSPITALISTS HISTORY AND PHYSICAL    2/28/2025 12:19 PM    Patient Information:  IRASEMA NEWELL is a 69 y.o. male [id number]  PCP:  William Kruse MD (Tel: [phone] )    Chief complaint:    Chief Complaint   Patient presents with    Dizziness     Pt states that around 0830 after he was up for a bit he started having dizziness. Pt states that when he was standing he felt off balance.     \    History of Present Illness:  Irasema Newell is a 69 y.o. male who presented with  SPresents to ER with complaints of dizziness.  Patient happily woke up this morning was feeling fine.  Went about his routine activity.  On 830 started having dizziness.  Fell off balance.  Specially when standing and walking in straight line.  Patient noted that his right side of the face was little drooping.  Having some blurry vision.  Lasted about seconds to a minute and then resolved.  Currently completely feeling better.  He said feels 95% better.  He has been ambulating without difficulty denies any headache currently no facial droop.  No chest pain no shortness of breath.  Stroke alert was called not a candidate for TNK due to improving symptoms.    REVIEW OF SYSTEMS:   Constitutional: Negative for fever,chills or night sweats  ENT: Negative for rhinorrhea, epistaxis, hoarseness, sore throat.  Respiratory: Negative for shortness of breath,wheezing  Cardiovascular: Negative for chest pain, palpitations   Gastrointestinal: Negative for nausea, vomiting, diarrhea  Genitourinary: Negative for polyuria, dysuria   Hematologic/Lymphatic: Negative for bleeding tendency, easy bruising  Musculoskeletal: Negative for myalgias and arthralgias  Neurologic: Negative for confusion,dysarthria.  Skin: Negative for itching,rash, good capillary refill.   Psychiatric: Negative for  02/28/2025 11:03 AM    HGB 14.0 02/28/2025 11:03 AM    HCT 40.7 02/28/2025 11:03 AM    MCV 91.4 02/28/2025 11:03 AM    MCH 31.4 02/28/2025 11:03 AM    MCHC 34.4 02/28/2025 11:03 AM    RDW 15.2 02/28/2025 11:03 AM     02/28/2025 11:03 AM    MPV 8.1 02/28/2025 11:03 AM     BMP:    Lab Results   Component Value Date/Time     02/28/2025 11:03 AM    K 4.2 02/28/2025 11:03 AM     02/28/2025 11:03 AM    CO2 26 02/28/2025 11:03 AM    BUN 15 02/28/2025 11:03 AM    CREATININE 1.0 02/28/2025 11:03 AM    CALCIUM 9.5 02/28/2025 11:03 AM    LABGLOM 81 02/28/2025 11:03 AM    LABGLOM >60 11/07/2023 08:50 AM    GLUCOSE 95 02/28/2025 11:03 AM       Chest Xray:   EKG:    I visualized CXR images and EKG strips     Discussed  wit     Problem List  Principal Problem:    TIA (transient ischemic attack)  Resolved Problems:    * No resolved hospital problems. *        Assessment/Plan:   Dizziness.  With facial droop currently with office visit resolved.  Concerning for TIA.  CT head negative.  Stroke alert was called not a candidate for TNK.  Admit for further evaluation including MRI echo.  Neurology consult.          Jin Moreno MD    2/28/2025 12:19 PM

## 2025-03-01 VITALS
OXYGEN SATURATION: 98 % | DIASTOLIC BLOOD PRESSURE: 85 MMHG | RESPIRATION RATE: 17 BRPM | TEMPERATURE: 97.4 F | BODY MASS INDEX: 33.43 KG/M2 | HEIGHT: 66 IN | HEART RATE: 82 BPM | SYSTOLIC BLOOD PRESSURE: 139 MMHG | WEIGHT: 208 LBS

## 2025-03-01 LAB
ANION GAP SERPL CALCULATED.3IONS-SCNC: 9 MMOL/L (ref 3–16)
BUN SERPL-MCNC: 15 MG/DL (ref 7–20)
CALCIUM SERPL-MCNC: 8.8 MG/DL (ref 8.3–10.6)
CHLORIDE SERPL-SCNC: 102 MMOL/L (ref 99–110)
CHOLEST SERPL-MCNC: 116 MG/DL (ref 0–199)
CO2 SERPL-SCNC: 25 MMOL/L (ref 21–32)
CREAT SERPL-MCNC: 1 MG/DL (ref 0.8–1.3)
DEPRECATED RDW RBC AUTO: 15.3 % (ref 12.4–15.4)
EST. AVERAGE GLUCOSE BLD GHB EST-MCNC: 122.6 MG/DL
GFR SERPLBLD CREATININE-BSD FMLA CKD-EPI: 81 ML/MIN/{1.73_M2}
GLUCOSE SERPL-MCNC: 95 MG/DL (ref 70–99)
HBA1C MFR BLD: 5.9 %
HCT VFR BLD AUTO: 38.6 % (ref 40.5–52.5)
HDLC SERPL-MCNC: 36 MG/DL (ref 40–60)
HGB BLD-MCNC: 13.4 G/DL (ref 13.5–17.5)
LDLC SERPL CALC-MCNC: 59 MG/DL
MCH RBC QN AUTO: 31.7 PG (ref 26–34)
MCHC RBC AUTO-ENTMCNC: 34.7 G/DL (ref 31–36)
MCV RBC AUTO: 91.2 FL (ref 80–100)
PLATELET # BLD AUTO: 258 K/UL (ref 135–450)
PMV BLD AUTO: 7.9 FL (ref 5–10.5)
POTASSIUM SERPL-SCNC: 4.1 MMOL/L (ref 3.5–5.1)
RBC # BLD AUTO: 4.24 M/UL (ref 4.2–5.9)
SODIUM SERPL-SCNC: 136 MMOL/L (ref 136–145)
TRIGL SERPL-MCNC: 104 MG/DL (ref 0–150)
VLDLC SERPL CALC-MCNC: 21 MG/DL
WBC # BLD AUTO: 7 K/UL (ref 4–11)

## 2025-03-01 PROCEDURE — APPNB30 APP NON BILLABLE TIME 0-30 MINS

## 2025-03-01 PROCEDURE — 80048 BASIC METABOLIC PNL TOTAL CA: CPT

## 2025-03-01 PROCEDURE — G0378 HOSPITAL OBSERVATION PER HR: HCPCS

## 2025-03-01 PROCEDURE — 6360000002 HC RX W HCPCS: Performed by: HOSPITALIST

## 2025-03-01 PROCEDURE — 80061 LIPID PANEL: CPT

## 2025-03-01 PROCEDURE — APPSS60 APP SPLIT SHARED TIME 46-60 MINUTES

## 2025-03-01 PROCEDURE — 36415 COLL VENOUS BLD VENIPUNCTURE: CPT

## 2025-03-01 PROCEDURE — 2500000003 HC RX 250 WO HCPCS: Performed by: HOSPITALIST

## 2025-03-01 PROCEDURE — 6370000000 HC RX 637 (ALT 250 FOR IP): Performed by: HOSPITALIST

## 2025-03-01 PROCEDURE — 96372 THER/PROPH/DIAG INJ SC/IM: CPT

## 2025-03-01 PROCEDURE — 83036 HEMOGLOBIN GLYCOSYLATED A1C: CPT

## 2025-03-01 PROCEDURE — 85027 COMPLETE CBC AUTOMATED: CPT

## 2025-03-01 RX ORDER — CYCLOBENZAPRINE HCL 10 MG
10 TABLET ORAL 3 TIMES DAILY PRN
Qty: 21 TABLET | Refills: 0 | Status: SHIPPED | OUTPATIENT
Start: 2025-03-01 | End: 2025-03-11

## 2025-03-01 RX ADMIN — ENOXAPARIN SODIUM 40 MG: 100 INJECTION SUBCUTANEOUS at 08:55

## 2025-03-01 RX ADMIN — Medication 10 ML: at 08:56

## 2025-03-01 RX ADMIN — LISINOPRIL 20 MG: 20 TABLET ORAL at 08:55

## 2025-03-01 RX ADMIN — ASPIRIN 81 MG 81 MG: 81 TABLET ORAL at 08:55

## 2025-03-01 NOTE — CONSULTS
In patient Neurology consult    [facility] Neurology  MD Geovani Mann  1955    Date of Service: 3/1/2025    Referring Physician: Jin Moreno MD      Reason for the consult and CC: Acute dizziness and ataxia    HPI:   The patient is a 69 y.o.  years old male with past medical history of hypertension, CAD, hyperlipidemia, lung CA status post left upper lobectomy and other medical problems comes to the hospital with acute dizziness.  Degree severe duration persistent.  Patient reports symptoms worsen when she is up and ambulating.  She reports no speech or vision disturbance, numbness, extremity weakness during this time.  He does report feeling off balance.  Thus came to the emergency room further evaluation.  Emergency room CT head showed no acute intracranial abnormality.  CTA showed no LVO and left ICA stenosis of 25%.  Initial blood pressure emergency room was systolic 160s.  Patient was mated to the hospital further evaluation.  Neurologist consulted MRI brain was ordered.  Patient underwent MRI brain which showed no acute intracranial normality.  Echocardiogram showed EF of 40-45%.  Today that he reports he is back to his baseline.  Denies any headaches, dizziness, vision disturbance, extremity weakness, dysarthria or dysphagia.  Other review of systems unremarkable       Constitutional:   Vitals:    02/28/25 1945 03/01/25 0030 03/01/25 0445 03/01/25 0745   BP: 139/74 125/68 (!) 146/73 139/85   Pulse: 79 77 79 82   Resp: 18 16 18 17   Temp: 97.4 °F (36.3 °C) 97.8 °F (36.6 °C) 97.7 °F (36.5 °C) 97.4 °F (36.3 °C)   TempSrc: Oral Oral Axillary Oral   SpO2: 97% 97% 97% 98%   Weight:       Height:             I personally reviewed and updated social history, past medical history, medications, allergy, surgical history, and family history as documented in the patient's electronic health records.       ROS: 10-14 ROS reviewed with the patient/nurse/family which were unremarkable except

## 2025-03-01 NOTE — PROGRESS NOTES
Pt discharged via self.  IV and tele removed.  Discharge instructions given.  Pt has no complaints and is stable.

## 2025-03-01 NOTE — DISCHARGE SUMMARY
[facility] DISCHARGE SUMMARY    Patient Demographics    Patient. Geovani Lagunas  Date of Birth. 1955  MRN. [id number]     Primary care provider. William Kruse MD  (Tel: [phone])    Admit date: 2/28/2025    Discharge date (blank if same as Note Date):   Note Date: 3/1/2025     Reason for Hospitalization.   Chief Complaint   Patient presents with    Dizziness     Pt states that around 0830 after he was up for a bit he started having dizziness. Pt states that when he was standing he felt off balance.            Problem-based Hospital Course.  TIA.  Acute stroke ruled out.  MRI negative.  Weakness better has some neck spasms started on Flexeril.    Abnormal echocardiogram.  Mild drop in EF.  45%.  From 55% last year no active chest pain  I have referred him to cardiology for further workup outpatient.  Detail discussed about having workup done inpatient versus outpatient he chose to need outpatient.  With normal troponin and no chest pain    Consults.  IP CONSULT TO NEUROLOGY  IP CONSULT TO CASE MANAGEMENT    Physical examination on discharge day.   /85   Pulse 82   Temp 97.4 °F (36.3 °C) (Oral)   Resp 17   Ht 1.676 m (5' 6\")   Wt 94.3 kg (208 lb)   SpO2 98%   BMI 33.57 kg/m²   General appearance.  Alert. Looks comfortable.  HEENT. Sclera clear. Moist mucus membranes.  Cardiovascular. Regular rate and rhythm, normal S1, S2. No murmur.   Respiratory. Not using accessory muscles.Clear to auscultation bilaterally, no wheeze.  Gastrointestinal. Abdomen soft, non-tender, not distended, normal bowel sounds  Neurology. Facial symmetry. No speech deficits. Moving all extremities equally.  Extremities. No edema in lower extremities.  Skin. Warm, dry, normal turgor    Condition at time of discharge stable     Medication instructions provided to patient at  discharge.     Medication List        START taking these medications      cyclobenzaprine 10 MG tablet  Commonly known as: FLEXERIL  Take 1 tablet by mouth 3 times daily as needed for Muscle spasms            CONTINUE taking these medications      aspirin 81 MG chewable tablet     atorvastatin 20 MG tablet  Commonly known as: LIPITOR     lisinopril 20 MG tablet  Commonly known as: PRINIVIL;ZESTRIL     naproxen 500 MG tablet  Commonly known as: Naprosyn  Take 1 tablet by mouth 2 times daily for 20 doses            STOP taking these medications      lisinopril-hydroCHLOROthiazide 20-12.5 MG per tablet  Commonly known as: PRINZIDE;ZESTORETIC               Where to Get Your Medications        [id number] - [place], [place] - [address] - P [phone] - F [phone]  [facility] [address], [facility] [address]      Phone: [phone]   cyclobenzaprine 10 MG tablet         Discharge recommendations given to patient.  Follow Up. pcp in 1 week   Disposition.  home  Activity. activity as tolerated  Diet: ADULT DIET; Regular      Spent 45  minutes in discharge process.    Signed:  Jin Moreno MD     3/1/2025 1:14 PM

## 2025-03-01 NOTE — PROGRESS NOTES
PhysicalOccupational Therapy  Geovani Lagunas  Orders received, chart reviewed. Per pt, all of his neurological symptoms have resolved and he is up ad hao in the room. Pt does not feel that he needs a skilled PT/OT evaluation at this time. Discussed with RN. Gustabo D/C from PT/OT caseload.   Thank you,  Holly Gordon PT, DPT [id number]  Mane Montero OTR/L, [id number]       Itchy scalp    A rash is a change in the color of the skin. A rash can also change the way your skin feels. There are many different conditions and factors that can cause a rash, most of which are not dangerous. Make sure to follow up with your primary care physician or a dermatologist as instructed by your health care provider.    SEEK IMMEDIATE MEDICAL CARE IF YOU HAVE ANY OF THE FOLLOWING SYMPTOMS: fever, blisters, a rash inside your mouth, vaginal or anal pain, or altered mental status.

## 2025-03-01 NOTE — PLAN OF CARE
Problem: Discharge Planning  Goal: Discharge to home or other facility with appropriate resources  3/1/2025 0504 by Dio Preston, RN  Outcome: Progressing  3/1/2025 0503 by Dio Preston, RN  Reactivated  Flowsheets (Taken 3/1/2025 0502)  Discharge to home or other facility with appropriate resources:   Identify barriers to discharge with patient and caregiver   Arrange for needed discharge resources and transportation as appropriate   Identify discharge learning needs (meds, wound care, etc)  2/28/2025 1648 by Jessika Sorenson, RN  Outcome: Adequate for Discharge     Problem: Safety - Adult  Goal: Free from fall injury  Outcome: Progressing

## 2025-03-01 NOTE — PROGRESS NOTES
ADVANCED CARE PLANNING    Name:Geovani Lagunas       :  1955              MRN:  [id number]        Purpose of Encounter: Advanced care planning in light of hospitalization  Parties In Attendance: Patient,    Decisional Capacity: Yes  Subjective: Patient  understand that this conversation is to address long term care goal  Objective:   Goals of Care Determination: Patient would pursue CPR and Intubation if required. No tracheostomy or long term ventilation if required.   Unsure about long term ventilation/tracheostomy, would want family to make the decision at the time if required    Advanced Care Planning Documents: documented patient's wishes, would like  PA to make medical decisions if unable to make decisions    Jin Moreno MD  3/1/2025 1:25 PM    Plan: Will notify William Kruse MD of change in care plan. Will look at further interventions as needed.   Code Status: At this time patient wishes to be Full Code  Time Spent with Patient: 21 minutes      Electronically signed by Jin Moreno MD on 3/1/2025 at 1:25 PM  Thank you William Kruse MD for the opportunity to be involved in this patient's care.      - completed augmentin 2 months ago  - doxycycline BID x 7 days  - medrol dose pack   -  Nasal saline, increase fluids  -  hand hygiene and infection prevention discussed  -  Warm compress to sinuses  - humidification  - recommend to eat yogurt twice daily while taking antibiotic or a daily probiotic

## 2025-03-05 ENCOUNTER — TELEPHONE (OUTPATIENT)
Dept: CARDIOLOGY CLINIC | Age: 70
End: 2025-03-05

## 2025-03-05 NOTE — TELEPHONE ENCOUNTER
Several appointments have been offered to patient without successful scheduling.  Patient wishes to stay with his cardiologist at [facility] [place].  He only came to [facility] for an emergency.  Do not schedule.